# Patient Record
Sex: MALE | Race: WHITE | NOT HISPANIC OR LATINO
[De-identification: names, ages, dates, MRNs, and addresses within clinical notes are randomized per-mention and may not be internally consistent; named-entity substitution may affect disease eponyms.]

---

## 2019-01-25 PROBLEM — Z00.00 ENCOUNTER FOR PREVENTIVE HEALTH EXAMINATION: Status: ACTIVE | Noted: 2019-01-25

## 2019-02-04 ENCOUNTER — RECORD ABSTRACTING (OUTPATIENT)
Age: 84
End: 2019-02-04

## 2019-02-04 DIAGNOSIS — Z78.9 OTHER SPECIFIED HEALTH STATUS: ICD-10-CM

## 2019-02-04 DIAGNOSIS — I51.9 HEART DISEASE, UNSPECIFIED: ICD-10-CM

## 2019-02-04 DIAGNOSIS — E78.00 PURE HYPERCHOLESTEROLEMIA, UNSPECIFIED: ICD-10-CM

## 2019-02-04 DIAGNOSIS — Z83.3 FAMILY HISTORY OF DIABETES MELLITUS: ICD-10-CM

## 2019-02-04 DIAGNOSIS — Z87.891 PERSONAL HISTORY OF NICOTINE DEPENDENCE: ICD-10-CM

## 2019-02-04 DIAGNOSIS — Z82.49 FAMILY HISTORY OF ISCHEMIC HEART DISEASE AND OTHER DISEASES OF THE CIRCULATORY SYSTEM: ICD-10-CM

## 2019-02-04 RX ORDER — ASPIRIN 81 MG
81 TABLET, DELAYED RELEASE (ENTERIC COATED) ORAL
Refills: 0 | Status: ACTIVE | COMMUNITY

## 2019-02-04 RX ORDER — PYRIDOXINE HCL (VITAMIN B6) 100 MG
TABLET ORAL
Refills: 0 | Status: ACTIVE | COMMUNITY

## 2019-02-04 RX ORDER — RAMIPRIL 5 MG/1
5 CAPSULE ORAL
Refills: 0 | Status: ACTIVE | COMMUNITY

## 2019-02-04 RX ORDER — MULTIVITAMIN
CAPSULE ORAL
Refills: 0 | Status: ACTIVE | COMMUNITY

## 2019-02-04 RX ORDER — ATORVASTATIN CALCIUM 20 MG/1
20 TABLET, FILM COATED ORAL
Refills: 0 | Status: ACTIVE | COMMUNITY

## 2019-02-04 RX ORDER — DABIGATRAN ETEXILATE MESYLATE 150 MG/1
150 CAPSULE ORAL
Refills: 0 | Status: ACTIVE | COMMUNITY

## 2019-02-04 RX ORDER — BACILLUS COAGULANS/INULIN 1B-250 MG
CAPSULE ORAL
Refills: 0 | Status: ACTIVE | COMMUNITY

## 2019-02-04 RX ORDER — MV-MN/LUTEIN/ZEAX/BILBER/HB277 5-1-7.5 MG
CAPSULE ORAL
Refills: 0 | Status: ACTIVE | COMMUNITY

## 2019-02-19 ENCOUNTER — APPOINTMENT (OUTPATIENT)
Dept: PODIATRY | Facility: CLINIC | Age: 84
End: 2019-02-19
Payer: MEDICARE

## 2019-02-19 VITALS
WEIGHT: 170 LBS | DIASTOLIC BLOOD PRESSURE: 60 MMHG | SYSTOLIC BLOOD PRESSURE: 110 MMHG | BODY MASS INDEX: 23.03 KG/M2 | HEIGHT: 72 IN

## 2019-02-19 PROCEDURE — 11721 DEBRIDE NAIL 6 OR MORE: CPT | Mod: 59

## 2019-02-19 NOTE — HISTORY OF PRESENT ILLNESS
[FreeTextEntry1] : The patient presents for followup of chronically thickened and painful nails on both feet. The patient did ve her with periodic debridements with removal of the offending schedules her great toenails

## 2019-02-19 NOTE — PROCEDURE
[FreeTextEntry1] : Using sterile instrumentation debridement of all nails manually and electrically to decrease thickness, pain and girth and make shoe gear more comfortable with "slant back" procedure of any bordering spicules causing pain\par \par I have had a lengthy discussion with the patient regarding overall skincare. The importance of the type of socks, the type of shoes, and the type of overall foot hygiene is important to help control and prevent eruptions especially over extreme weather changes. This included but was not limited to hydration and lubrication, dove soap, triple rinse clothing and linens. I also explained the importance of thorough drying of both feet especially the web spaces. Given the extreme temperatures back in a car I also reviewed the type of shoes that would help reduce the chances of cracking of the skin especially leading to fissuring of the heels. Overall skincare precautions were reviewed education literature dispensed in the patient's questions asked and answered appropriately.\par

## 2019-02-19 NOTE — PHYSICAL EXAM
[Right Foot Was Examined] : The right foot was examined [Normal Appearance] : normal in appearance [Tenderness] : tender [Delayed in the Right Toes] : delayed in the toes [Normal in Appearance] : normal in appearance [Normal] : normal [Swelling] : swollen [Delayed in the Left Toes] : delayed in the toes [0] : 0 in the dorsalis pedis [Toenails Thickening] : hypertrophied [Toenails Discoloration] : discolored [Nails Ingrowing Foot Bilateral] : ingrown [Nails Subungual Debris] : shown to have subungual debris [] : tender [Erythema] : not erythematous [Full ROM] : with limited range of motion [Tenderness] : not tender

## 2019-03-13 ENCOUNTER — APPOINTMENT (OUTPATIENT)
Dept: GASTROENTEROLOGY | Facility: CLINIC | Age: 84
End: 2019-03-13
Payer: MEDICARE

## 2019-03-13 ENCOUNTER — TRANSCRIPTION ENCOUNTER (OUTPATIENT)
Age: 84
End: 2019-03-13

## 2019-03-13 VITALS
DIASTOLIC BLOOD PRESSURE: 64 MMHG | SYSTOLIC BLOOD PRESSURE: 106 MMHG | BODY MASS INDEX: 23.84 KG/M2 | WEIGHT: 176 LBS | HEART RATE: 60 BPM | HEIGHT: 72 IN

## 2019-03-13 PROCEDURE — 99214 OFFICE O/P EST MOD 30 MIN: CPT

## 2019-03-13 NOTE — HISTORY OF PRESENT ILLNESS
[FreeTextEntry1] : THIS IS HEALTHY EIGHTY [age]X YEAR OLD GENTLEMAN..\par HE IS HERE FOR COLON CANCER SCREENING.\par HE DID HAVE A COLONOSCOPY ON AUG 4TH, 2011.  WHICH WAS NEGATIVE.\par \par HE HS NO KNOWN HX OF COLONIC POLYPS.\par HE HAS NO RECTAL BLEEDING.\par \par THERE IS NO FAMILY HX OF COLON CANCER OR POLYPS , OR ANY GI CANCER IN THE FAMILY.\par \par HE IS IN GOOD HEALTH ; NO SIGNIF PROBLEMS OTHER THAN A FIB..AND HE TAKES ELIQUIS..\par AND HAS PACEMAKER\par AND SOME SHORT TERM MEMORY LOSS, AND HE IS COGNITIVELY AND NEUROLOGICALL Y STABLE AT THIS TIME

## 2019-03-13 NOTE — PHYSICAL EXAM
[Normal Sphincter Tone] : normal sphincter tone [No Rectal Mass] : no rectal mass [Occult Blood Positive] : stool was negative for occult blood

## 2019-03-13 NOTE — ASSESSMENT
[FreeTextEntry1] : 1.  PATIENT IS A DELIGHTFUL GENTLEMAN, HERE TO DISCUSS COLONOSCOPY\par 2.  IT TURNS OUT THAT HE DID HAVE COLONOSCPY AUGUST 2011..\par AND IT WAS NEGA\par 2.  HE IS ON PRADAX FOR HIS A FIB\par 3.  HE HAS A PACEMAKER.\par \par 4i HAVE EXPLAINED THE MOST RECENT RECOMMENDATIONS OF THE MEDICAL SOCIETIES..THAT COLON CANCER SCREENING BE DISCONTINUED SOMETIME BETWEEN THE AGES OF 75 AND 85.\par \par 5.  THAT BEING SAID, I AM WILLING TO DO THE COLONOSCOPY..BUT IT ISNT CLEARLY NEEDED JUST YET AS IT IS ONLY SEVEN YEARS.\par AND THE RECTAL EXAM TODAY IS GUAIAC NEGATIVE..NO RECTAL MASS

## 2019-04-23 ENCOUNTER — APPOINTMENT (OUTPATIENT)
Dept: PODIATRY | Facility: CLINIC | Age: 84
End: 2019-04-23
Payer: MEDICARE

## 2019-04-23 VITALS
DIASTOLIC BLOOD PRESSURE: 64 MMHG | SYSTOLIC BLOOD PRESSURE: 110 MMHG | BODY MASS INDEX: 23.84 KG/M2 | WEIGHT: 176 LBS | HEIGHT: 72 IN

## 2019-04-23 PROCEDURE — 11721 DEBRIDE NAIL 6 OR MORE: CPT

## 2019-04-23 NOTE — PROCEDURE
[FreeTextEntry1] : Using sterile instrumentation debridement of all nails manually and electrically to decrease thickness, pain and girth and make shoe gear more comfortable with "slant back" procedure of any bordering spicules causing pain\par

## 2019-04-23 NOTE — PHYSICAL EXAM
[Right Foot Was Examined] : The right foot was examined [Normal Appearance] : normal in appearance [Tenderness] : tender [Delayed in the Right Toes] : delayed in the toes [Normal in Appearance] : normal in appearance [Normal] : normal [Swelling] : swollen [Delayed in the Left Toes] : delayed in the toes [0] : 0 in the dorsalis pedis [Toenails Discoloration] : discolored [Toenails Thickening] : hypertrophied [Nails Subungual Debris] : shown to have subungual debris [Nails Ingrowing Foot Bilateral] : ingrown [] : tender [Erythema] : not erythematous [Full ROM] : with limited range of motion [FreeTextEntry1] : The patient has all contributing factors to onychomycosis including but not limited to thickness, subungual debris, discoloration and partial lysis and they are brittle when cut. [Tenderness] : not tender

## 2019-04-23 NOTE — HISTORY OF PRESENT ILLNESS
[FreeTextEntry1] : The patient presents for follow up of chronic and painful mycotic nail disease. Past professional  tx's in the presence of PAD have consisted of periodic debridements which have offered significant relief and controlling of symptoms\par

## 2019-06-09 PROBLEM — I51.9 HEART DISEASE: Status: ACTIVE | Noted: 2019-02-04

## 2019-06-28 ENCOUNTER — APPOINTMENT (OUTPATIENT)
Dept: PODIATRY | Facility: CLINIC | Age: 84
End: 2019-06-28
Payer: MEDICARE

## 2019-06-28 VITALS
SYSTOLIC BLOOD PRESSURE: 110 MMHG | BODY MASS INDEX: 23.84 KG/M2 | WEIGHT: 176 LBS | DIASTOLIC BLOOD PRESSURE: 70 MMHG | HEIGHT: 72 IN

## 2019-06-28 DIAGNOSIS — I89.0 LYMPHEDEMA, NOT ELSEWHERE CLASSIFIED: ICD-10-CM

## 2019-06-28 DIAGNOSIS — G89.29 PAIN IN RIGHT FOOT: ICD-10-CM

## 2019-06-28 DIAGNOSIS — M79.671 PAIN IN RIGHT FOOT: ICD-10-CM

## 2019-06-28 DIAGNOSIS — B35.1 TINEA UNGUIUM: ICD-10-CM

## 2019-06-28 DIAGNOSIS — M79.672 PAIN IN LEFT FOOT: ICD-10-CM

## 2019-06-28 DIAGNOSIS — I70.91 GENERALIZED ATHEROSCLEROSIS: ICD-10-CM

## 2019-06-28 DIAGNOSIS — G89.29 PAIN IN LEFT FOOT: ICD-10-CM

## 2019-06-28 PROCEDURE — 11721 DEBRIDE NAIL 6 OR MORE: CPT | Mod: 59

## 2019-06-28 NOTE — PHYSICAL EXAM
[Right Foot Was Examined] : The right foot was examined [Normal Appearance] : normal in appearance [Tenderness] : tender [Delayed in the Right Toes] : delayed in the toes [Normal in Appearance] : normal in appearance [Swelling] : swollen [Normal] : normal [Delayed in the Left Toes] : delayed in the toes [0] : 0 in the dorsalis pedis [Erythema] : not erythematous [Full ROM] : with limited range of motion [Tenderness] : not tender [FreeTextEntry1] : The patient has all contributing factors to onychomycosis including but not limited to thickness, subungual debris, discoloration and partial lysis and they are brittle when cut.

## 2019-09-05 ENCOUNTER — APPOINTMENT (OUTPATIENT)
Dept: PODIATRY | Facility: CLINIC | Age: 84
End: 2019-09-05

## 2020-12-16 ENCOUNTER — APPOINTMENT (OUTPATIENT)
Dept: NEUROSURGERY | Facility: CLINIC | Age: 85
End: 2020-12-16
Payer: MEDICARE

## 2020-12-16 PROCEDURE — 99212 OFFICE O/P EST SF 10 MIN: CPT | Mod: 95

## 2020-12-16 NOTE — ASSESSMENT
[FreeTextEntry1] : I have discussed the natural history and treatment options for stable C2 vertebral body fracture with the patient and his wife. I explained the indications for observation, conservative management, medical management, physical therapy, pain management approaches and surgery. I discussed the risks, benefits, possible complications and expected outcome related to each treatment option. In the end my recommendation is to continue nonoperative management with cervical collar for a total of 8 weeks with repeat CT scan at that time for reassessment. Patient understood and agreed with our plan of care and decided to move forward with it. All questions were answered.\par

## 2020-12-16 NOTE — HISTORY OF PRESENT ILLNESS
[FreeTextEntry1] : Due to COVID 19 pandemic, patient has agreed to see me via telemedicine visit where we will review his clinical history and imaging studies but unfortunately I am not able to perform a thorough physical exam, which may impact my capability to make an accurate diagnosis and patient understands that. \par During the telemedicine visit patient I was located in my office at [facility] and patient was located in his home at [address] [place] [address].\par \par Mr. DEVORAH LIMON is a 88 year year-old patient with PMH of HTN, Alzheimer disease, hyperlipidemia  who comes for neurosurgical follow up for C2 vertebral body fracture on 11/30/2020. Patient was started on cervical collar at that time and continues to use it as instructed. \par Patient reports no pain.\par Patient denies motor or sensation deficits, balance or gait problems, urinary or bowel incontinence.\par \par

## 2021-01-27 ENCOUNTER — RESULT REVIEW (OUTPATIENT)
Age: 86
End: 2021-01-27

## 2021-01-27 ENCOUNTER — APPOINTMENT (OUTPATIENT)
Dept: NEUROSURGERY | Facility: CLINIC | Age: 86
End: 2021-01-27
Payer: MEDICARE

## 2021-01-27 VITALS
BODY MASS INDEX: 23.03 KG/M2 | SYSTOLIC BLOOD PRESSURE: 117 MMHG | DIASTOLIC BLOOD PRESSURE: 69 MMHG | HEIGHT: 72 IN | TEMPERATURE: 97.3 F | HEART RATE: 64 BPM | WEIGHT: 170 LBS

## 2021-01-27 PROCEDURE — 99215 OFFICE O/P EST HI 40 MIN: CPT

## 2021-01-27 NOTE — END OF VISIT
[FreeTextEntry3] : I have seen the patient and reviewed the case together with PA and I agree with the final recommendations and plan of care.\par \par Guillermo Conklin MD\par Neurosurgery\par \par  [Time Spent: ___ minutes] : I have spent [unfilled] minutes of time on the encounter. [>50% of the face to face encounter time was spent on counseling and/or coordination of care for ___] : Greater than 50% of the face to face encounter time was spent on counseling and/or coordination of care for [unfilled]

## 2021-01-27 NOTE — ASSESSMENT
[FreeTextEntry1] : I have discussed the natural history and treatment options for stable C2 vertebral body fracture with the patient and his wife. I explained the indications for observation, conservative management, medical management, physical therapy, pain management approaches and surgery. I discussed the risks, benefits, possible complications and expected outcome related to each treatment option. In the end my recommendation is to continue nonoperative management with cervical collar at this time.  He should continue wearing the collar when ambulating and when out of the house or participating with PT. He may take the collar off when showering, sitting idly, eating and sleeping. The patient should return to the office in 4 months time with repeat CT cspine prior to visit. Patient understood and agreed with our plan of care and decided to move forward with it. All questions were answered.

## 2021-01-27 NOTE — DATA REVIEWED
[de-identified] : 1/27/21: Exam Date:      01/27/21\par Exam:         CT CERVICAL SPINE\par Order#:       [id number]\par \par \par \par EXAM: CT cervical spine without contrast\par \par  INDICATION: C2 fracture follow-up\par \par  TECHNIQUE: CT exam cervical spine without contrast. Multiple axial images obtained from mid orbits\par to the sternoclavicular joint. Sagittal/coronal reformatted images obtained from axial data set.\par Images reviewed in soft tissue and bone windows.\par \par  COMPARISON: December 1, 2020 CT November 30, 2020 cervical spine\par \par  FINDINGS:\par \par  Partially imaged left-sided dual-lead pacemaker noted on  radiograph.\par \par  Redemonstrated, there is a healing fracture of the inferior aspect of the C2 vertebral body with a\par transverse component extending into the bilateral pedicles and transverse processes as well as a\par vertical components on the posterior inferior aspect of the C2 vertebral body. There is slightly\par increased callus formation from December 1, 2020 exam. There is slightly increased posterior\par displacement of the vertical component when compared from December exam (9:104). Transverse\par components are unchanged in alignment.\par \par  Cervical lordosis maintained Cervical vertebral body heights are maintained. There is mild C4-C5\par anterolisthesis, mild C3-C4 anterolisthesis, mild C5-C6 anterolisthesis and mild C6-C7\par anterolisthesis. Cervical spinous processes are intact. Articular pillars of cervical spine are\par intact. No jumped or perched facets identified. Arch of C1 appear to be intact. No acute fracture\par identified. Atlantooccipital and atlantodental articulations are within normal limits. The\par atlantodental interval within normal limits there are mild multilevel degenerative endplate changes\par with anterior osteophytes and endplate irregularity. There is mild to moderate intervertebral disc\par space narrowing. Paravertebral soft tissues are within normal limits. Visualized lung apices are\par unremarkable. Partially imaged completely characterized hypodense lesion within the left thyroid\par lobe is unchanged may represent nodule.\par \par  There are multilevel findings as follows:\par \par  Craniocervical junction unremarkable.\par \par  C2-C3: No significant canal or foraminal stenosis.\par \par  C3-C4: Disc osteophyte complex and bilateral uncovertebral/facet hypertrophy contributing to mild\par canal stenosis and mild bilateral foraminal stenosis.\par \par  C4-C5: Disc osteophyte complex and bilateral uncovertebral/facet hypertrophy contributing to mild\par canal stenosis and moderate to severe bilateral foraminal stenosis.\par \par \par  C5-C6: Disc osteophyte complex and bilateral uncovertebral/facet hypertrophy contributing to mild\par to moderate canal stenosis and moderate to severe bilateral foraminal stenosis.\par \par  C6-C7: Disc osteophyte complex and bilateral uncovertebral/facet hypertrophy contributing to mild\par to moderate canal stenosis and moderate to severe bilateral foraminal stenosis.\par \par  C7-T1: No significant canal or foraminal stenosis.\par \par  Overall degree of spondylitic changes are not significantly changed from December 2020 exam\par \par \par \par  IMPRESSION:\par \par  Chronic fracture of the posterior inferior aspect of the C2 vertebral body with increased callus\par formation and slightly increased posterior displacement of the vertical component when compared to\par December 2020 exam\par \par  Multilevel cervical spondylitic changes as described above notable for mild to moderate canal\par stenosis C5-C6 and moderate to severe bilateral foraminal stenosis at C4-C5, C5-C6 and C6-C7,\par unchanged from December 2020 exam.\par \par ***Electronically Signed ***\par -----------------------------------------------\par Corby Canada MD              01/27/21 1423\par \par Dictated on 01/27/21\par

## 2021-01-27 NOTE — PHYSICAL EXAM
[General Appearance - Alert] : alert [General Appearance - In No Acute Distress] : in no acute distress [Oriented To Time, Place, And Person] : oriented to person, place, and time [Impaired Insight] : insight and judgment were intact [Affect] : the affect was normal [Person] : oriented to person [Place] : oriented to place [Time] : oriented to time [Short Term Intact] : short term memory intact [Remote Intact] : remote memory intact [Span Intact] : the attention span was normal [Concentration Intact] : normal concentrating ability [Fluency] : fluency intact [Comprehension] : comprehension intact [Current Events] : adequate knowledge of current events [Past History] : adequate knowledge of personal past history [Vocabulary] : adequate range of vocabulary [Cranial Nerves Optic (II)] : visual acuity intact bilaterally,  pupils equal round and reactive to light [Cranial Nerves Oculomotor (III)] : extraocular motion intact [Cranial Nerves Trigeminal (V)] : facial sensation intact symmetrically [Cranial Nerves Facial (VII)] : face symmetrical [Cranial Nerves Vestibulocochlear (VIII)] : hearing was intact bilaterally [Cranial Nerves Glossopharyngeal (IX)] : tongue and palate midline [Cranial Nerves Accessory (XI - Cranial And Spinal)] : head turning and shoulder shrug symmetric [Cranial Nerves Hypoglossal (XII)] : there was no tongue deviation with protrusion [Motor Tone] : muscle tone was normal in all four extremities [No Muscle Atrophy] : normal bulk in all four extremities [Sensation Tactile Decrease] : light touch was intact [2+] : Patella left 2+ [Past-pointing] : there was no past-pointing [Tremor] : no tremor present [FreeTextEntry6] : left arm weakness due to biceps rupture (old)

## 2021-01-27 NOTE — REASON FOR VISIT
" no more babies"
[FreeTextEntry1] : Mr. DEVORAH LIMON is a 88 year year-old patient with PMH of HTN, Alzheimer disease, hyperlipidemia who comes for neurosurgical follow up and image review for C2 vertebral body fracture on 11/30/2020. Patient was started on cervical collar at that time and continues to use it as instructed. \par Patient reports no pain.  Patient denies motor or sensation deficits, balance or gait problems, urinary or bowel incontinence.\par

## 2021-05-19 ENCOUNTER — RESULT REVIEW (OUTPATIENT)
Age: 86
End: 2021-05-19

## 2021-05-24 ENCOUNTER — APPOINTMENT (OUTPATIENT)
Dept: NEUROSURGERY | Facility: CLINIC | Age: 86
End: 2021-05-24
Payer: MEDICARE

## 2021-05-24 VITALS
TEMPERATURE: 97 F | BODY MASS INDEX: 23.7 KG/M2 | SYSTOLIC BLOOD PRESSURE: 107 MMHG | WEIGHT: 175 LBS | DIASTOLIC BLOOD PRESSURE: 69 MMHG | HEART RATE: 66 BPM | HEIGHT: 72 IN

## 2021-05-24 DIAGNOSIS — S12.100A UNSPECIFIED DISPLACED FRACTURE OF SECOND CERVICAL VERTEBRA, INITIAL ENCOUNTER FOR CLOSED FRACTURE: ICD-10-CM

## 2021-05-24 PROCEDURE — 99215 OFFICE O/P EST HI 40 MIN: CPT

## 2021-05-24 NOTE — REASON FOR VISIT
[FreeTextEntry1] : Mr. Corrigan returns today for interval image review and follow up of his C2 vertebral body fracture sustained on 11/30/20.  He reports no pain.  He is doing well with PT/OT.    CT spine performed on 5/19/21 shows improved callus formation and stable alignment (report below)\par \par 1/27/21: Mr. DEVORAH CORRIGAN is a 88 year year-old patient with PMH of HTN, Alzheimer disease, hyperlipidemia who comes for neurosurgical follow up and image review for C2 vertebral body fracture on 11/30/2020. Patient was started on cervical collar at that time and continues to use it as instructed. \par Patient reports no pain. Patient denies motor or sensation deficits, balance or gait problems, urinary or bowel incontinence.

## 2021-05-24 NOTE — ASSESSMENT
[FreeTextEntry1] : I have discussed the natural history and treatment options for stable C2 vertebral body fracture with the patient and his wife. I explained the indications for observation, conservative management, medical management, physical therapy, pain management approaches and surgery. I discussed the risks, benefits, possible complications and expected outcome related to each treatment option. In the end my recommendation is to continue nonoperative management with cervical collar at this time. He should continue wearing the collar when ambulating and when out of the house or participating with PT. He may take the collar off when showering, sitting idly, eating and sleeping. The patient should return as needed. Patient understood and agreed with our plan of care and decided to move forward with it. All questions were answered.

## 2021-05-24 NOTE — PHYSICAL EXAM
[FreeTextEntry1] : Constitutional: alert and in no acute distress. \par Psychiatric: oriented to person, place, and time, insight and judgment were intact and the affect was normal. \par \par Orientation: oriented to person, oriented to place and oriented to time. \par Memory: short term memory intact and remote memory intact. \par Attention: the attention span was normal and normal concentrating ability. \par Language: fluency intact and comprehension intact. \par Fund of knowledge: displays adequate knowledge of current events, adequate knowledge of personal past history and adequate range of vocabulary. \par Cranial Nerves: visual acuity intact bilaterally, pupils equal round and reactive to light, extraocular motion intact, facial sensation intact symmetrically, face symmetrical, hearing was intact bilaterally, tongue and palate midline, head turning and shoulder shrug symmetric and there was no tongue deviation with protrusion. \par Motor: muscle tone was normal in all four extremities and normal bulk in all four extremities. \par Motor Strength: left arm weakness due to biceps rupture (old). \par Sensory exam: light touch was intact. \par Coordination:. there was no past-pointing. no tremor present. \par Deep tendon reflexes: \par Biceps right 2+. Biceps left 2+.  \par Triceps right 2+. Triceps left 2+.  \par Patella right 2+. Patella left 2+.

## 2021-12-27 NOTE — PROCEDURE
aspirin 81 mg oral delayed release tablet: 1 tab(s) orally once a day  atorvastatin 40 mg oral tablet: 1 tab(s) orally once a day (at bedtime)  azithromycin 500 mg oral tablet: 1 tab(s) orally once a day  cefuroxime 500 mg oral tablet: 1 tab(s) orally every 12 hours  Eliquis 5 mg oral tablet: 1 tab(s) orally 2 times a day  ergocalciferol 1.25 mg (50,000 intl units) oral capsule: 1 cap(s) orally once a week   lactobacillus acidophilus oral capsule: 1 cap(s) orally 2 times a day 2 hours after antibiotics  loperamide 2 mg oral capsule: 1 cap(s) orally once a day, As needed, Diarrhea  Metoprolol Tartrate 25 mg oral tablet: 2 tab(s) orally 2 times a day  ondansetron 4 mg oral tablet, disintegratin tab(s) orally every 8 hours, As Needed -for nausea   Xanax 0.25 mg oral tablet: 1 tab(s) orally once a day, As Needed  
[FreeTextEntry1] : Using sterile instrumentation debridement of all nails manually and electrically to decrease thickness, pain and girth and make shoe gear more comfortable with "slant back" procedure of any bordering spicules causing pain\par

## 2022-01-01 ENCOUNTER — APPOINTMENT (OUTPATIENT)
Dept: NEUROLOGY | Facility: CLINIC | Age: 87
End: 2022-01-01

## 2022-01-01 VITALS
HEIGHT: 72 IN | WEIGHT: 190 LBS | TEMPERATURE: 98.7 F | DIASTOLIC BLOOD PRESSURE: 74 MMHG | OXYGEN SATURATION: 95 % | SYSTOLIC BLOOD PRESSURE: 110 MMHG | BODY MASS INDEX: 25.73 KG/M2 | HEART RATE: 67 BPM

## 2022-01-01 PROCEDURE — 99214 OFFICE O/P EST MOD 30 MIN: CPT

## 2022-01-01 RX ORDER — CEPHALEXIN 500 MG/1
500 CAPSULE ORAL
Refills: 0 | Status: COMPLETED | COMMUNITY
End: 2022-01-01

## 2022-05-20 ENCOUNTER — NON-APPOINTMENT (OUTPATIENT)
Age: 87
End: 2022-05-20

## 2022-05-20 ENCOUNTER — APPOINTMENT (OUTPATIENT)
Dept: NEUROLOGY | Facility: CLINIC | Age: 87
End: 2022-05-20
Payer: MEDICARE

## 2022-05-20 VITALS
SYSTOLIC BLOOD PRESSURE: 105 MMHG | HEART RATE: 63 BPM | DIASTOLIC BLOOD PRESSURE: 80 MMHG | BODY MASS INDEX: 26.01 KG/M2 | OXYGEN SATURATION: 95 % | HEIGHT: 72 IN | TEMPERATURE: 97.7 F | WEIGHT: 192 LBS

## 2022-05-20 PROCEDURE — 99205 OFFICE O/P NEW HI 60 MIN: CPT

## 2022-05-20 RX ORDER — DONEPEZIL HYDROCHLORIDE 10 MG/1
10 TABLET ORAL
Qty: 90 | Refills: 3 | Status: ACTIVE | COMMUNITY

## 2022-05-26 NOTE — HISTORY OF PRESENT ILLNESS
[FreeTextEntry1] : This is a 89-year-old man who is being seen in neurologic consultation.  He is brought to this visit by his wife and his caregiver.  Patient was born in [place].  He spent most of his life in [place].  He was an  and traveled worldwide.  He used to follow with Dr. Rahman\par \par He was diagnosed with Alzheimer's dementia based on his clinical history as well as a PET study.  PET scan was done at [facility] on July 13, 2015.  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  I will get this scanned into the chart.  He has been on donepezil and Namenda for quite some time.\par \par Patient dementia has been complicated by gait disturbance, poor coordination and urinary incontinence.  In November, 2020 he had a fall with a C-spine fracture.  He has private aides that provide 24 hours continuous assistance.  They help him bathe, dress, and are involved in his transfers and with ambulation.  He uses a walker for short distances and wheelchair for long distances.\par \par Patient used to be an avid cyclist and a walker.\par \par According to his wife he remembers his early childhood best.  Recent memory is most affected.\par She describes them as a "perfect gentleman."  He is not aggressive or agitated.  He sleeps well overall.  He does wake up to urinate.\par He is eating very well.\par \par They note no complaints today.\par Seroquel at night -  25-50 mg qhs prn for sleep.\par \par h/o lymphoma - chemo and radiation; low wbc \par Also has a history of atrial fibrillation for which he is on anticoagulation.\par

## 2022-05-26 NOTE — ASSESSMENT
[FreeTextEntry1] : He has late onset Alzheimer's disease.  His symptoms have been stable.  His wife and caregiver Roderick are very involved in his care.  They have no complaints right now.\par \par He will continue Namenda and donepezil as before.  He will continue quetiapine at bedtime as it helps him sleep.\par \par

## 2022-05-26 NOTE — PHYSICAL EXAM
[FreeTextEntry1] : Physical examination \par General: No acute distress, Awake, Alert. \par \par Mental status \par Awake, alert, and oriented to person, Not time and place, Impaired attention span and concentration, Recent and remote memory NOT  intact, Language - has word finding difficulty, Fund of knowledge impaired\par Cranial Nerves \par II: VFF \par III, IV, VI: PERRL, EOMI. \par V: Facial sensation is normal B/L. \par VII: Facial strength is normal B/L. \par VIII: Gross hearing is reduced\par IX, X: Palate is midline and elevates symmetrically. \par XI: Trapezius normal strength. \par XII: Tongue midline without atrophy or fasciculations. \par \par Motor exam \par Muscle tone - no evidence of rigidity or resistance in all 4 extremities. \par No atrophy or fasciculations \par Muscle Strength: Proximal lower ext strength is 4/5 .\par \par Reflexes \par Hyporeflexia\par \par Coordination \par Unable to follow directions\par \par Gait \par Able to get up with 2 arm push off but needs mechanical assist

## 2022-11-22 NOTE — ASSESSMENT
[FreeTextEntry1] : He has late onset Alzheimer's disease.  His symptoms have been stable.  His wife and caregiver Vonda are very involved in his care.  They do find his cognition is declining but he has no behavioral issues. \par \par He will continue Namenda and donepezil as before.  He will continue quetiapine at bedtime as it helps him sleep.\par \par Next f/u via tele.\par \par \par \par

## 2022-11-22 NOTE — PHYSICAL EXAM
[FreeTextEntry1] : Physical examination \par General: No acute distress, Awake, Alert to self- knows he is in doctors office but not sure where; not aware of date; no insight. Falling asleep. \par \par Mental status \par Awake, alert, and oriented to person, Not time and place, Impaired attention span and concentration, Recent and remote memory NOT  intact, Language - has word finding difficulty, Fund of knowledge impaired\par Cranial Nerves \par II: VFF - blinks to threat but not able to follow commands\par III, IV, VI: PERRL, EOMI. \par V: Facial sensation is normal B/L. \par VII: Facial strength is normal B/L. \par VIII: Gross hearing is reduced\par IX, X: Palate is midline and elevates symmetrically. \par XI: Trapezius normal strength. \par XII: Tongue midline without atrophy or fasciculations. \par \par Motor exam \par [place]\par Muscle Strength: Proximal lower ext strength is 4/5 .\par \par Reflexes \par Hyporeflexia\par \par Coordination \par Unable to follow directions\par \par Gait \par Unable to stand without assist

## 2022-11-22 NOTE — HISTORY OF PRESENT ILLNESS
[FreeTextEntry1] : keeps eyes closed \par \par moaning night and day - \par vocalization\par not in pain\par talks in sleep but not agitated or aggressive\par \par legs weaker but he does not walk much.\par \par stopped PT due to patient having COVID \par \par sleeps but can wake up during the night\par \par Has to be fed.\par \par Has a 24 hour caretaker. \par \par 5/20/22\par This is a 89-year-old man who is being seen in neurologic consultation.  He is brought to this visit by his wife and his caregiver.  Patient was born in [place].  He spent most of his life in [facility].  He was an  and traveled worldwide.  He used to follow with Dr. Rahman\par \par He was diagnosed with Alzheimer's dementia based on his clinical history as well as a PET study.  PET scan was done at [facility] on July 13, 2015.  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  I will get this scanned into the chart.  He has been on donepezil and Namenda for quite some time.\par \par Patient dementia has been complicated by gait disturbance, poor coordination and urinary incontinence.  In November, 2020 he had a fall with a C-spine fracture.  He has private aides that provide 24 hours continuous assistance.  They help him bathe, dress, and are involved in his transfers and with ambulation.  He uses a walker for short distances and wheelchair for long distances.\par \par Patient used to be an avid cyclist and a walker.\par \par According to his wife he remembers his early childhood best.  Recent memory is most affected.\par She describes them as a "perfect gentleman."  He is not aggressive or agitated.  He sleeps well overall.  He does wake up to urinate.\par He is eating very well.\par \par They note no complaints today.\par Seroquel at night -  25-50 mg qhs prn for sleep.\par \par h/o lymphoma - chemo and radiation; low wbc \par Also has a history of atrial fibrillation for which he is on anticoagulation.\par

## 2023-01-01 ENCOUNTER — LABORATORY RESULT (OUTPATIENT)
Age: 88
End: 2023-01-01

## 2023-01-01 ENCOUNTER — NON-APPOINTMENT (OUTPATIENT)
Age: 88
End: 2023-01-01

## 2023-01-01 ENCOUNTER — APPOINTMENT (OUTPATIENT)
Dept: GERIATRICS | Facility: HOME HEALTH | Age: 88
End: 2023-01-01
Payer: MEDICARE

## 2023-01-01 ENCOUNTER — TRANSCRIPTION ENCOUNTER (OUTPATIENT)
Age: 88
End: 2023-01-01

## 2023-01-01 ENCOUNTER — APPOINTMENT (OUTPATIENT)
Dept: NEUROLOGY | Facility: CLINIC | Age: 88
End: 2023-01-01

## 2023-01-01 ENCOUNTER — RESULT REVIEW (OUTPATIENT)
Age: 88
End: 2023-01-01

## 2023-01-01 ENCOUNTER — APPOINTMENT (OUTPATIENT)
Dept: GERIATRICS | Facility: HOME HEALTH | Age: 88
End: 2023-01-01

## 2023-01-01 VITALS
HEART RATE: 50 BPM | TEMPERATURE: 96.6 F | DIASTOLIC BLOOD PRESSURE: 70 MMHG | SYSTOLIC BLOOD PRESSURE: 120 MMHG | OXYGEN SATURATION: 98 %

## 2023-01-01 VITALS
SYSTOLIC BLOOD PRESSURE: 110 MMHG | RESPIRATION RATE: 18 BRPM | DIASTOLIC BLOOD PRESSURE: 80 MMHG | OXYGEN SATURATION: 96 % | HEART RATE: 74 BPM

## 2023-01-01 DIAGNOSIS — Z95.0 PRESENCE OF CARDIAC PACEMAKER: ICD-10-CM

## 2023-01-01 DIAGNOSIS — Z74.09 OTHER REDUCED MOBILITY: ICD-10-CM

## 2023-01-01 DIAGNOSIS — T81.49XA INFECTION FOLLOWING A PROCEDURE, OTHER SURGICAL SITE, INITIAL ENCOUNTER: ICD-10-CM

## 2023-01-01 DIAGNOSIS — F02.80 ALZHEIMER'S DISEASE, UNSPECIFIED: ICD-10-CM

## 2023-01-01 DIAGNOSIS — R52 PAIN, UNSPECIFIED: ICD-10-CM

## 2023-01-01 DIAGNOSIS — G30.9 ALZHEIMER'S DISEASE, UNSPECIFIED: ICD-10-CM

## 2023-01-01 DIAGNOSIS — I48.91 UNSPECIFIED ATRIAL FIBRILLATION: ICD-10-CM

## 2023-01-01 DIAGNOSIS — I10 ESSENTIAL (PRIMARY) HYPERTENSION: ICD-10-CM

## 2023-01-01 DIAGNOSIS — G47.00 INSOMNIA, UNSPECIFIED: ICD-10-CM

## 2023-01-01 DIAGNOSIS — Z71.89 OTHER SPECIFIED COUNSELING: ICD-10-CM

## 2023-01-01 DIAGNOSIS — N39.0 URINARY TRACT INFECTION, SITE NOT SPECIFIED: ICD-10-CM

## 2023-01-01 DIAGNOSIS — K81.1 CHRONIC CHOLECYSTITIS: ICD-10-CM

## 2023-01-01 PROCEDURE — 99344 HOME/RES VST NEW MOD MDM 60: CPT

## 2023-01-01 PROCEDURE — 99348 HOME/RES VST EST LOW MDM 30: CPT

## 2023-01-01 RX ORDER — TRAZODONE HYDROCHLORIDE 50 MG/1
50 TABLET ORAL
Qty: 90 | Refills: 3 | Status: ACTIVE | COMMUNITY
Start: 2023-01-01 | End: 1900-01-01

## 2023-01-01 RX ORDER — MEMANTINE HYDROCHLORIDE 28 MG/1
28 CAPSULE, EXTENDED RELEASE ORAL
Qty: 90 | Refills: 3 | Status: ACTIVE | COMMUNITY
Start: 1900-01-01 | End: 1900-01-01

## 2023-01-01 RX ORDER — MUPIROCIN 20 MG/G
2 OINTMENT TOPICAL
Qty: 1 | Refills: 0 | Status: ACTIVE | COMMUNITY
Start: 2023-01-01 | End: 1900-01-01

## 2023-01-01 RX ORDER — QUETIAPINE FUMARATE 25 MG/1
25 TABLET ORAL
Qty: 60 | Refills: 5 | Status: ACTIVE | COMMUNITY
Start: 1900-01-01 | End: 1900-01-01

## 2023-02-24 PROBLEM — Z71.89 COUNSELING REGARDING ADVANCE DIRECTIVES AND GOALS OF CARE: Status: ACTIVE | Noted: 2023-01-01

## 2023-02-24 PROBLEM — R52 PAIN: Status: ACTIVE | Noted: 2023-01-01

## 2023-02-24 NOTE — HISTORY OF PRESENT ILLNESS
[FreeTextEntry1] : Wood Corrigan ("Laly") is a 89 y/o M w/ PMH AD, AF s/p PPM, HFrEF (EF ~40%), mod MR, MI, chronic b/l LE lymphedema, recent admission to [facility] 1/14-1/23 for acute  cholecystitis s/p perc fred drain (1/15) + RLL PNA + large R pleural effusion s/p thoracentesis (1/15, 1.25L), HFrEF who presents today for a home visit to establish primary palliative care. \par \par Head CT 1/17/23 --> Ventricles and sulci are moderately disproportionately prominent likely related to some degree of moderate parenchymal volume loss\par \par Echo 1/17/23 --> LVEF 41%, mod MR, technically difficult study\par \par 2/24 (Initial eval, home visit): Pt's wife reports the following: \par - Pt was completely independent as of the fall '20, at which time he had a fall c/b a C2 vertebral fracture. Since that point, his balance and confusions have gradually been worsening, especially since the most recent admission documented above. Since that point, he is barely walking, moans a lot of the time, and is now completely dependent on assistance for ADLs.\par - Appetite is good, some coughing with eating. She tries to make the food as smooth as possible\par - Sleeps a lot- 14H at night and then 2H nap in the middle of the day. Sometimes he is up and talking at night but does not seem to be overly agitated\par - BMs Q3 days. Incontnent x 2\par - Does not appear to be in pain but wife reports that he now moans constantly \par - MOLST has already been completed- DNR/DNI\par - Regarding his mood- he gets angry and agitated at times\par - No recent falls\par \par Sawyere (wife)\par 4 children\par PCP Dr. Stephens\par 24/7 private HHA\par \par

## 2023-02-24 NOTE — ASSESSMENT
[FreeTextEntry1] : # Dementia with behavioral disturbances\par - C/w Seroquel 12.5 or 25mg PRN\par - Advised 25mg PO QHS\par \par # Health maintenance\par - Refer to Dr. Islas for PCP\par - Will refer to [facility] for PT per wife's request\par \par # Pain\par - Tylenol 1G TID JOEL\par - Wife requesting to avoid Percocet or codeine derivatives 2/2 "fast HR and shock"\par \par # ACP\par - MOLST already completed- DNR/DNI- dates 1/19/23\par - WIfe to look into alternate HHA agencies; had already contacted an eldercare attn'y re: Medicaid- not happening for now\par - Reviewed home hospice- he would not qualify right now- explained qualification criteria; will apply once he becomes eligible\par \par RTC in 2 months or PRN sooner [DNR] : Code Status: DNR [DNI] : Intubation: DNI

## 2023-02-24 NOTE — PHYSICAL EXAM
[General Appearance - Alert] : alert [General Appearance - In No Acute Distress] : in no acute distress [Sclera] : the sclera and conjunctiva were normal [Normal Oral Mucosa] : normal oral mucosa [No Oral Pallor] : no oral pallor [Neck Appearance] : the appearance of the neck was normal [] : no respiratory distress [Exaggerated Use Of Accessory Muscles For Inspiration] : no accessory muscle use [Auscultation Breath Sounds / Voice Sounds] : lungs were clear to auscultation bilaterally [Bowel Sounds] : normal bowel sounds [Abdomen Soft] : soft [Abdomen Tenderness] : non-tender [FreeTextEntry1] : Conversational but does not answer questions appropriately

## 2023-03-21 PROBLEM — T81.49XA CELLULITIS OF DRAINAGE SITE, POST-OPERATIVE: Status: ACTIVE | Noted: 2023-01-01

## 2023-03-22 NOTE — HISTORY OF PRESENT ILLNESS
[FreeTextEntry1] : Wood Corrigan ("Laly") is a 89 y/o M w/ PMH AD, AF s/p PPM, HFrEF (EF ~40%), mod MR, MI, chronic b/l LE lymphedema, recent admission to [facility] 1/14-1/23 for acute  cholecystitis s/p perc fred drain (1/15) + RLL PNA + large R pleural effusion s/p thoracentesis (1/15, 1.25L), HFrEF who presents today for a home visit to establish primary palliative care. \par \par Head CT 1/17/23 --> Ventricles and sulci are moderately disproportionately prominent likely related to some degree of moderate parenchymal volume loss\par \par Echo 1/17/23 --> LVEF 41%, mod MR, technically difficult study\par \par 2/24 (Initial eval, home visit): Pt's wife reports the following: \par - Pt was completely independent as of the fall '20, at which time he had a fall c/b a C2 vertebral fracture. Since that point, his balance and confusions have gradually been worsening, especially since the most recent admission documented above. Since that point, he is barely walking, moans a lot of the time, and is now completely dependent on assistance for ADLs.\par - Appetite is good, some coughing with eating. She tries to make the food as smooth as possible\par - Sleeps a lot- 14H at night and then 2H nap in the middle of the day. Sometimes he is up and talking at night but does not seem to be overly agitated\par - BMs Q3 days. Incontnent x 2\par - Does not appear to be in pain but wife reports that he now moans constantly \par - MOLST has already been completed- DNR/DNI\par - Regarding his mood- he gets angry and agitated at times\par - No recent falls\par \par 3/21 (f/u, sick visit):\par - Wife reports redness and pain at perc fred drain insertion site, which she was told may now be lifelong\par - Otherwise status quo. No fever. No other complaints. \par \par Akash (wife)\par 4 children\par PCP Dr. Stephens\par 24/7 private HHA\par \par

## 2023-04-27 PROBLEM — G47.00 INSOMNIA: Status: ACTIVE | Noted: 2023-01-01

## 2023-04-27 PROBLEM — N39.0 ACUTE LOWER UTI: Status: RESOLVED | Noted: 2023-01-01 | Resolved: 2023-05-27

## 2023-04-27 PROBLEM — K81.1 CHOLECYSTITIS, CHRONIC: Status: ACTIVE | Noted: 2023-01-01

## 2023-04-27 PROBLEM — Z74.09 IMPAIRED FUNCTIONAL MOBILITY, BALANCE, GAIT, AND ENDURANCE: Status: ACTIVE | Noted: 2022-01-01

## 2023-04-27 PROBLEM — I48.91 ATRIAL FIBRILLATION: Status: ACTIVE | Noted: 2023-01-01

## 2023-04-27 PROBLEM — Z95.0 PACEMAKER: Status: ACTIVE | Noted: 2022-01-01

## 2023-04-27 PROBLEM — G30.9 ALZHEIMER DISEASE: Status: ACTIVE | Noted: 2019-02-04

## 2023-04-27 PROBLEM — I10 ESSENTIAL HYPERTENSION: Status: ACTIVE | Noted: 2019-02-19

## 2023-05-01 NOTE — ASSESSMENT
[DNR] : Code Status: DNR [DNI] : Intubation: DNI [FreeTextEntry1] : # Perc fred site infection\par - Requires VNS for family education on proper wound care and care in general of lifelong perc fred drain\par - Cleans with normal saline, remove any debris, pat dry, apply mupirocin daily.\par -education to family regarding care to percutaneous drain. \par Sylvain\par \par \par 4/27/23\par new aides - new insurance through [employer]\par trial of trazodone\par Alzheimers\par gait disorder\par perc fred drain - works well\par just had labs\par doing well

## 2023-05-01 NOTE — HISTORY OF PRESENT ILLNESS
[FreeTextEntry1] : Wood Corrigan ("Laly") is a 89 y/o M w/ PMH AD, AF s/p PPM, HFrEF (EF ~40%), mod MR, MI, chronic b/l LE lymphedema, recent admission to [facility] 1/14-1/23 for acute  cholecystitis s/p perc fred drain (1/15) + RLL PNA + large R pleural effusion s/p thoracentesis (1/15, 1.25L), HFrEF who presents today for a home visit to establish primary palliative care. \par \par Head CT 1/17/23 --> Ventricles and sulci are moderately disproportionately prominent likely related to some degree of moderate parenchymal volume loss\par \par Echo 1/17/23 --> LVEF 41%, mod MR, technically difficult study\par \par 2/24 (Initial eval, home visit): Pt's wife reports the following: \par - Pt was completely independent as of the fall '20, at which time he had a fall c/b a C2 vertebral fracture. Since that point, his balance and confusions have gradually been worsening, especially since the most recent admission documented above. Since that point, he is barely walking, moans a lot of the time, and is now completely dependent on assistance for ADLs.\par - Appetite is good, some coughing with eating. She tries to make the food as smooth as possible\par - Sleeps a lot- 14H at night and then 2H nap in the middle of the day. Sometimes he is up and talking at night but does not seem to be overly agitated\par - BMs Q3 days. Incontnent x 2\par - Does not appear to be in pain but wife reports that he now moans constantly \par - MOLST has already been completed- DNR/DNI\par - Regarding his mood- he gets angry and agitated at times\par - No recent falls\par \par 3/21 (f/u, sick visit):\par - Wife reports redness and pain at perc fred drain insertion site, which she was told may now be lifelong\par - Otherwise status quo. No fever. No other complaints. \par \par Akash (wife)\par 4 children\par PCP Dr. Stephens\par 24/7 private HHA\par \par 4/27/23\par taking over as PCP\par has been followed by \[facility]\"" care\par Alzheimers\par per fred drain

## 2023-05-01 NOTE — PHYSICAL EXAM
[General Appearance - Alert] : alert [General Appearance - In No Acute Distress] : in no acute distress [Sclera] : the sclera and conjunctiva were normal [Normal Oral Mucosa] : normal oral mucosa [No Oral Pallor] : no oral pallor [Neck Appearance] : the appearance of the neck was normal [] : no respiratory distress [Exaggerated Use Of Accessory Muscles For Inspiration] : no accessory muscle use [Bowel Sounds] : normal bowel sounds [Alert] : alert [Well Nourished] : well nourished [Healthy Appearing] : healthy appearing [No Acute Distress] : in no acute distress [Well Developed] : well developed [Normal Voice/Communication] : normal voice/communication [Normal] : the appearance was normal, neck was supple [No Respiratory Distress] : no respiratory distress [No Acc Muscle Use] : no accessory muscle use [Respiration, Rhythm And Depth] : normal respiratory rhythm and effort [Auscultation Breath Sounds / Voice Sounds] : lungs were clear to auscultation bilaterally [Normal S1, S2] : normal S1 and S2 [Heart Rate And Rhythm] : heart rate was normal and rhythm regular [Abdomen Tenderness] : non-tender [Abdomen Soft] : soft [de-identified] : confused - can walk with walker [de-identified] : + LE edema [FreeTextEntry1] : Conversational but does not answer questions appropriately

## 2023-06-29 ENCOUNTER — APPOINTMENT (OUTPATIENT)
Dept: GERIATRICS | Facility: HOME HEALTH | Age: 88
End: 2023-06-29